# Patient Record
Sex: FEMALE | Race: OTHER
[De-identification: names, ages, dates, MRNs, and addresses within clinical notes are randomized per-mention and may not be internally consistent; named-entity substitution may affect disease eponyms.]

---

## 2018-12-17 ENCOUNTER — HOSPITAL ENCOUNTER (OUTPATIENT)
Dept: HOSPITAL 80 - FCATH | Age: 83
Discharge: HOME | End: 2018-12-17
Attending: INTERNAL MEDICINE
Payer: COMMERCIAL

## 2018-12-17 DIAGNOSIS — M79.7: ICD-10-CM

## 2018-12-17 DIAGNOSIS — F32.9: ICD-10-CM

## 2018-12-17 DIAGNOSIS — F41.9: ICD-10-CM

## 2018-12-17 DIAGNOSIS — Z79.01: ICD-10-CM

## 2018-12-17 DIAGNOSIS — Z86.010: ICD-10-CM

## 2018-12-17 DIAGNOSIS — Z88.0: ICD-10-CM

## 2018-12-17 DIAGNOSIS — C90.00: ICD-10-CM

## 2018-12-17 DIAGNOSIS — Z95.0: ICD-10-CM

## 2018-12-17 DIAGNOSIS — M48.061: ICD-10-CM

## 2018-12-17 DIAGNOSIS — Z95.1: ICD-10-CM

## 2018-12-17 DIAGNOSIS — I49.5: ICD-10-CM

## 2018-12-17 DIAGNOSIS — Z87.891: ICD-10-CM

## 2018-12-17 DIAGNOSIS — E11.40: ICD-10-CM

## 2018-12-17 DIAGNOSIS — J44.9: ICD-10-CM

## 2018-12-17 DIAGNOSIS — E55.9: ICD-10-CM

## 2018-12-17 DIAGNOSIS — G47.33: ICD-10-CM

## 2018-12-17 DIAGNOSIS — Z86.73: ICD-10-CM

## 2018-12-17 DIAGNOSIS — I25.10: ICD-10-CM

## 2018-12-17 DIAGNOSIS — I10: ICD-10-CM

## 2018-12-17 DIAGNOSIS — E78.5: ICD-10-CM

## 2018-12-17 DIAGNOSIS — Z98.1: ICD-10-CM

## 2018-12-17 DIAGNOSIS — Z79.84: ICD-10-CM

## 2018-12-17 DIAGNOSIS — I48.91: Primary | ICD-10-CM

## 2018-12-17 LAB
INR PPP: 4.68 (ref 0.83–1.16)
PROTHROMBIN TIME: 43.5 SEC (ref 12–15)
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
## 2018-12-17 NOTE — PDTEE1
RUDI Cardioversion Procedure


Procedure: electrical cardioversion


Indications: atrial fibrillation


Consent: signed and in chart


Anticoagulation: warfarin


Procedural Details: 


Pads were placed in anterior-posterior position.  RUDI probe was advanced and 

standard images obtained.  There is no evidence of left atrial or left atrial 

appendage thrombus.





Synchronized cardioversion attempt #1: 200J


Conclusions: successful RUDI cardioversion


Patient Problems: 


 Problems











Problem Status Onset


 


CAD (coronary artery disease) Acute  


 


Fusion of lumbar spine Acute

## 2018-12-18 NOTE — CPEKG
Test Reason : OPEN

Blood Pressure : ***/*** mmHG

Vent. Rate : 075 BPM     Atrial Rate : 194 BPM

   P-R Int : 053 ms          QRS Dur : 131 ms

    QT Int : 448 ms       P-R-T Axes : 000 -79 095 degrees

   QTc Int : 501 ms

 

Afib/flut and V-paced complexes

 

Confirmed by Ezio Rodríguez ([id number]) on 12/18/2018 6:14:03 AM

 

Referred By:             Confirmed By:Ezio Rodríguez

## 2018-12-19 NOTE — ECHO
[email or web address]





[place] [id number] 

Main: [phone] 



Fax: 



Transesophageal Echocardiography 

Name:            MEGHANN CREWS                       MR#:

[id number]

Study Date:      12/17/2018                           Study Time:

02:04 PM

YOB: 1935                           Age:

83 year(s)

Height:            ( )                                Weight:

( )

BSA:                                                  Gender:

Female

Examination:     RUDI                                  Indication:

cardioversion

Image Quality:                                        Contrast: 

Requested by: 

Heart Rate:                                           Rhythm: 

BP:                / 



Procedure Staff 

Ultrasound Technician:   Nadeen Sanchez RDCS 

Reading Physician:       Juan Servin MD 

Requesting Provider: 



RUDI Exam Details 



Conclusions:          Normal size left ventricle.  

Normal global systolic LV function.  

The left atrial appendage is unilobular.  

Good color flow doppler in the left atrial appendage.  

No thrombus in left appendage.  

Cardioversion perfomred immediately following the procedure. 







Measurements: 

Chambers                   Valvular Assessment AV/MV          Valvular

Assessment TV/PV



Normal                                Normal

Normal

Name         Value   Range             Name        Value Range

Name          Value Range



Additional Measurements: 



Findings:             Left Ventricle: 

Normal size left ventricle. Normal global systolic LV function.  

Right Ventricle: 

Normal size right ventricle. Normal RV function.  

Left Atrial Appendage: 

The left atrial appendage is unilobular. Good color flow doppler in

the left atrial appendage. No

thrombus in left appendage.  

Mitral Valve: 



Patient: MEGHANN CREWS                     MRN: [id number]

Study Date: 12/17/2018   Page 1 of 2

02:04 PM 









The mitral valve is normal in appearance and function. Mild mitral

valve regurgitation is present. No

mitral stenosis is present. 



l1n 



Electronically signed by Juan Servin MD on 12/17/2018 at 02:34 PM 

(No Signature Object) 



Patient: MEGHANN CREWS                     MRN: [id number]

Study Date: 12/17/2018   Page 2 of 2

02:04 PM 







[id number]